# Patient Record
Sex: FEMALE | Race: WHITE | HISPANIC OR LATINO | Employment: UNEMPLOYED | URBAN - METROPOLITAN AREA
[De-identification: names, ages, dates, MRNs, and addresses within clinical notes are randomized per-mention and may not be internally consistent; named-entity substitution may affect disease eponyms.]

---

## 2019-08-12 ENCOUNTER — APPOINTMENT (OUTPATIENT)
Dept: RADIOLOGY | Facility: MEDICAL CENTER | Age: 24
End: 2019-08-12
Attending: EMERGENCY MEDICINE

## 2019-08-12 ENCOUNTER — HOSPITAL ENCOUNTER (EMERGENCY)
Facility: MEDICAL CENTER | Age: 24
End: 2019-08-12
Attending: EMERGENCY MEDICINE

## 2019-08-12 VITALS
WEIGHT: 155 LBS | HEIGHT: 67 IN | DIASTOLIC BLOOD PRESSURE: 83 MMHG | OXYGEN SATURATION: 96 % | BODY MASS INDEX: 24.33 KG/M2 | TEMPERATURE: 98.9 F | HEART RATE: 104 BPM | SYSTOLIC BLOOD PRESSURE: 106 MMHG | RESPIRATION RATE: 16 BRPM

## 2019-08-12 DIAGNOSIS — J45.32 MILD PERSISTENT ASTHMA WITH STATUS ASTHMATICUS: ICD-10-CM

## 2019-08-12 LAB
ANION GAP SERPL CALC-SCNC: 14 MMOL/L (ref 0–11.9)
BASOPHILS # BLD AUTO: 0.5 % (ref 0–1.8)
BASOPHILS # BLD: 0.06 K/UL (ref 0–0.12)
BUN SERPL-MCNC: 7 MG/DL (ref 8–22)
CALCIUM SERPL-MCNC: 9.6 MG/DL (ref 8.5–10.5)
CHLORIDE SERPL-SCNC: 107 MMOL/L (ref 96–112)
CO2 SERPL-SCNC: 18 MMOL/L (ref 20–33)
CREAT SERPL-MCNC: 0.7 MG/DL (ref 0.5–1.4)
EKG IMPRESSION: NORMAL
EOSINOPHIL # BLD AUTO: 1.31 K/UL (ref 0–0.51)
EOSINOPHIL NFR BLD: 10.1 % (ref 0–6.9)
ERYTHROCYTE [DISTWIDTH] IN BLOOD BY AUTOMATED COUNT: 47.4 FL (ref 35.9–50)
GLUCOSE SERPL-MCNC: 98 MG/DL (ref 65–99)
HCT VFR BLD AUTO: 45 % (ref 37–47)
HGB BLD-MCNC: 14.2 G/DL (ref 12–16)
IMM GRANULOCYTES # BLD AUTO: 0.03 K/UL (ref 0–0.11)
IMM GRANULOCYTES NFR BLD AUTO: 0.2 % (ref 0–0.9)
LYMPHOCYTES # BLD AUTO: 3.41 K/UL (ref 1–4.8)
LYMPHOCYTES NFR BLD: 26.2 % (ref 22–41)
MCH RBC QN AUTO: 28.3 PG (ref 27–33)
MCHC RBC AUTO-ENTMCNC: 31.6 G/DL (ref 33.6–35)
MCV RBC AUTO: 89.6 FL (ref 81.4–97.8)
MONOCYTES # BLD AUTO: 0.68 K/UL (ref 0–0.85)
MONOCYTES NFR BLD AUTO: 5.2 % (ref 0–13.4)
NEUTROPHILS # BLD AUTO: 7.51 K/UL (ref 2–7.15)
NEUTROPHILS NFR BLD: 57.8 % (ref 44–72)
NRBC # BLD AUTO: 0 K/UL
NRBC BLD-RTO: 0 /100 WBC
PLATELET # BLD AUTO: 426 K/UL (ref 164–446)
PMV BLD AUTO: 8.2 FL (ref 9–12.9)
POTASSIUM SERPL-SCNC: 3.4 MMOL/L (ref 3.6–5.5)
RBC # BLD AUTO: 5.02 M/UL (ref 4.2–5.4)
SODIUM SERPL-SCNC: 139 MMOL/L (ref 135–145)
WBC # BLD AUTO: 13 K/UL (ref 4.8–10.8)

## 2019-08-12 PROCEDURE — 96365 THER/PROPH/DIAG IV INF INIT: CPT

## 2019-08-12 PROCEDURE — 93005 ELECTROCARDIOGRAM TRACING: CPT | Performed by: EMERGENCY MEDICINE

## 2019-08-12 PROCEDURE — 700101 HCHG RX REV CODE 250: Performed by: EMERGENCY MEDICINE

## 2019-08-12 PROCEDURE — 85025 COMPLETE CBC W/AUTO DIFF WBC: CPT

## 2019-08-12 PROCEDURE — 80048 BASIC METABOLIC PNL TOTAL CA: CPT

## 2019-08-12 PROCEDURE — 94640 AIRWAY INHALATION TREATMENT: CPT

## 2019-08-12 PROCEDURE — 700111 HCHG RX REV CODE 636 W/ 250 OVERRIDE (IP): Performed by: EMERGENCY MEDICINE

## 2019-08-12 PROCEDURE — 99285 EMERGENCY DEPT VISIT HI MDM: CPT

## 2019-08-12 PROCEDURE — 96366 THER/PROPH/DIAG IV INF ADDON: CPT

## 2019-08-12 PROCEDURE — 71045 X-RAY EXAM CHEST 1 VIEW: CPT

## 2019-08-12 PROCEDURE — 96375 TX/PRO/DX INJ NEW DRUG ADDON: CPT

## 2019-08-12 PROCEDURE — 93005 ELECTROCARDIOGRAM TRACING: CPT

## 2019-08-12 RX ORDER — METHYLPREDNISOLONE SODIUM SUCCINATE 125 MG/2ML
125 INJECTION, POWDER, LYOPHILIZED, FOR SOLUTION INTRAMUSCULAR; INTRAVENOUS ONCE
Status: COMPLETED | OUTPATIENT
Start: 2019-08-12 | End: 2019-08-12

## 2019-08-12 RX ORDER — MAGNESIUM SULFATE HEPTAHYDRATE 40 MG/ML
2 INJECTION, SOLUTION INTRAVENOUS ONCE
Status: COMPLETED | OUTPATIENT
Start: 2019-08-12 | End: 2019-08-12

## 2019-08-12 RX ORDER — PREDNISONE 20 MG/1
40 TABLET ORAL DAILY
Qty: 12 TAB | Refills: 0 | Status: SHIPPED | OUTPATIENT
Start: 2019-08-12 | End: 2019-08-18

## 2019-08-12 RX ORDER — ONDANSETRON 2 MG/ML
4 INJECTION INTRAMUSCULAR; INTRAVENOUS ONCE
Status: COMPLETED | OUTPATIENT
Start: 2019-08-12 | End: 2019-08-12

## 2019-08-12 RX ORDER — ALBUTEROL SULFATE 90 UG/1
2 AEROSOL, METERED RESPIRATORY (INHALATION) EVERY 6 HOURS PRN
COMMUNITY

## 2019-08-12 RX ADMIN — ALBUTEROL SULFATE 10 MG/HR: 5 SOLUTION RESPIRATORY (INHALATION) at 11:59

## 2019-08-12 RX ADMIN — ONDANSETRON 4 MG: 2 INJECTION INTRAMUSCULAR; INTRAVENOUS at 12:32

## 2019-08-12 RX ADMIN — METHYLPREDNISOLONE SODIUM SUCCINATE 125 MG: 125 INJECTION, POWDER, FOR SOLUTION INTRAMUSCULAR; INTRAVENOUS at 11:40

## 2019-08-12 RX ADMIN — MAGNESIUM SULFATE IN WATER 2 G: 40 INJECTION, SOLUTION INTRAVENOUS at 11:43

## 2019-08-12 NOTE — ED NOTES
Pt up ambulated around ER .  States she feels better at this time with no SOB.  States she is ready to go home.

## 2019-08-12 NOTE — ED PROVIDER NOTES
"ED Provider Note    Scribed for Jesse Lora M.D. by Milind Victor. 8/12/2019  11:20 AM    Primary care provider: No primary care provider on file.  Means of arrival: Ambulance.  History obtained from: Patient's cousin.  History limited by: None    CHIEF COMPLAINT  Chief Complaint   Patient presents with   • Shortness of Breath     times 3 days.  using albuterol inhaler but not helping.  visiting from [place]       HPI  Diamond Bustamante is a 24 y.o. Female with a history of asthma, who presents to the Emergency Department for shortness of breath onset 3 days ago. She is accompanied by her cousin who reports the patient has associated coughing, sore throat, nausea, and midline chest pain. The cousin also notes the patient has been sounding \"wheezy\". Pain is exacerbated when she coughs. Patient has needed to use her inhaler 2x daily for the past 3 days, however, she normally only needs to use it 2-3 times a week. She denies fever, emesis, or diarrhea. The patient additional reports previous admission to the hospital for asthma, which was not in the ICU.    REVIEW OF SYSTEMS  Pertinent positives include shortness of breath, coughing, sore throat, nausea, chest pain, \"weezy\". Pertinent negatives include no fever, emesis, or diarrhea.  All other systems reviewed and negative.    PAST MEDICAL HISTORY  Asthma.    SURGICAL HISTORY  patient denies any surgical history    SOCIAL HISTORY  Social History     Tobacco Use   • Smoking status: None reported   Substance Use Topics   • Alcohol use: None reported   • Drug use: None reported      Social History     Substance and Sexual Activity   Drug Use None reported       FAMILY HISTORY  None noted when reviewed.    CURRENT MEDICATIONS  Home Medications     Reviewed by Danny Ibarra (Pharmacy Tech) on 08/12/19 at 1228  Med List Status: Complete   Medication Last Dose Status   albuterol 108 (90 Base) MCG/ACT Aero Soln inhalation aerosol 8/12/2019 Active          " "      ALLERGIES  Allergies   Allergen Reactions   • Shellfish Allergy Vomiting     Nauseas         PHYSICAL EXAM  VITAL SIGNS: /90   Pulse (!) 110   Temp 37.2 °C (98.9 °F) (Tympanic)   Resp (!) 28   Ht 1.702 m (5' 7\")   Wt 70.3 kg (155 lb)   BMI 24.28 kg/m²     Vital signs reviewed.  Constitutional:  Appears uncomfortable. No distress.   Head: Normocephalic.   Mouth/Throat: Oropharynx is clear and moist.   Eyes: EOM are normal. Pupils are equal, round, and reactive to light.   Neck: Normal range of motion. Neck supple.   Cardiovascular: Normal rate, regular rhythm and normal heart sounds.  No pedal edema.   Pulmonary/Chest: Lungs have inspiratory and expiratory wheezing with prolonged expiratory phase.  Abdominal: Soft. There is no tenderness. There is no rebound and no guarding. Abnormal bowel sounds.   Musculoskeletal: Exhibits no pitting edema.   Lymphadenopathy: No cervical adenopathy.   Neurological: Patient is alert and oriented to person, place, and time. DTRs intact. Normal sensation and strength.  Skin: Skin is warm and dry. No rash.  Psychiatric: A&O x 3    LABS  Results for orders placed or performed during the hospital encounter of 08/12/19   CBC w/ Differential   Result Value Ref Range    WBC 13.0 (H) 4.8 - 10.8 K/uL    RBC 5.02 4.20 - 5.40 M/uL    Hemoglobin 14.2 12.0 - 16.0 g/dL    Hematocrit 45.0 37.0 - 47.0 %    MCV 89.6 81.4 - 97.8 fL    MCH 28.3 27.0 - 33.0 pg    MCHC 31.6 (L) 33.6 - 35.0 g/dL    RDW 47.4 35.9 - 50.0 fL    Platelet Count 426 164 - 446 K/uL    MPV 8.2 (L) 9.0 - 12.9 fL    Neutrophils-Polys 57.80 44.00 - 72.00 %    Lymphocytes 26.20 22.00 - 41.00 %    Monocytes 5.20 0.00 - 13.40 %    Eosinophils 10.10 (H) 0.00 - 6.90 %    Basophils 0.50 0.00 - 1.80 %    Immature Granulocytes 0.20 0.00 - 0.90 %    Nucleated RBC 0.00 /100 WBC    Neutrophils (Absolute) 7.51 (H) 2.00 - 7.15 K/uL    Lymphs (Absolute) 3.41 1.00 - 4.80 K/uL    Monos (Absolute) 0.68 0.00 - 0.85 K/uL    Eos " (Absolute) 1.31 (H) 0.00 - 0.51 K/uL    Baso (Absolute) 0.06 0.00 - 0.12 K/uL    Immature Granulocytes (abs) 0.03 0.00 - 0.11 K/uL    NRBC (Absolute) 0.00 K/uL   Basic Metabolic Panel (BMP)   Result Value Ref Range    Sodium 139 135 - 145 mmol/L    Potassium 3.4 (L) 3.6 - 5.5 mmol/L    Chloride 107 96 - 112 mmol/L    Co2 18 (L) 20 - 33 mmol/L    Glucose 98 65 - 99 mg/dL    Bun 7 (L) 8 - 22 mg/dL    Creatinine 0.70 0.50 - 1.40 mg/dL    Calcium 9.6 8.5 - 10.5 mg/dL    Anion Gap 14.0 (H) 0.0 - 11.9   ESTIMATED GFR   Result Value Ref Range    GFR If African American >60 >60 mL/min/1.73 m 2    GFR If Non African American >60 >60 mL/min/1.73 m 2   EKG (NOW)   Result Value Ref Range    Report       [facility] Emergency Dept.    Test Date:  2019  Pt Name:    CHANDNI PALMA                 Department: ER  MRN:        [id number]                      Room:       [facility]  Gender:     Female                       Technician: [id number]  :        1995                   Requested By:ER TRIAGE PROTOCOL  Order #:    [id number]                    Reading MD:    Measurements  Intervals                                Axis  Rate:       86                           P:          44  IA:         128                          QRS:        3  QRSD:       92                           T:          54  QT:         380  QTc:        455    Interpretive Statements  SINUS RHYTHM  RSR' IN V1 OR V2, PROBABLY NORMAL VARIANT  No previous ECG available for comparison       All labs reviewed by me.    EKG  12 Lead EKG interpreted by me to show sinus rhythm at 85. Normal P waves. R prime in V 1 with early polarization. Normal ST segments. Normal T waves. Normal EKG with right bundle branch block.    RADIOLOGY  DX-CHEST-PORTABLE (1 VIEW)   Final Result      No acute cardiopulmonary findings.        The radiologist's interpretation of all radiological studies have been reviewed by me.    COURSE & MEDICAL DECISION MAKING  Pertinent Labs &  Imaging studies reviewed. (See chart for details) The patient's [facility] and past medical  records were reviewed    11:20 AM - Patient seen and examined at bedside. Patient will be treated with Zofran 4mg, Solumedrol 125mg, Proventil 2.5 mg/0.5 mL, and magnesium sulfate IVPB premix 2 g. Ordered DX-Chest Portable, CBC w/diff, BMP, Estimated GFR, and EKG to evaluate her symptoms. The differential diagnoses include but are not limited to: Pneumothorax, pneumonia, status asthmaticus    12:56 PM - Patient is feeling much better.   Her work appears unremarkable.  Her EKG shows no signs of ischemia.  She was given magnesium and steroids.  Initially she was quite tachycardic but observed here for some time.  She states she feels much better she was ambulating without difficulty actually ready to go home.    1:40PM- The patient is feeling better. Her heart rate is still in the 140s, which is likely from her albuterol treatment. I will keep her in the ED to monitor her.     2:30PM Patient was reevaluated at bedside. Discussed lab and radiology results with the patient and informed them that there are no acute findings seen. Patient was given return precautions and welcomed to return to the ED. Patient understood and verbalized agreement.       CRITICAL CARE  The very real possibilty of a deterioration of this patient's condition required the highest level of my preparedness for sudden, emergent intervention.  I provided critical care services, which included medication orders, frequent reevaluations of the patient's condition and response to treatment, ordering and reviewing test results, and discussing the case with various consultants.  The critical care time associated with the care of the patient was 30 minutes for status asthmaticus. Review chart for interventions. This time is exclusive of any other billable procedures.      The patient will return for new or worsening symptoms and is stable at the time of  discharge.    The patient is referred to a primary physician for blood pressure management, diabetic screening, and for all other preventative health concerns.      DISPOSITION:  Patient will be discharged home in stable condition.    FOLLOW UP:  [facility], Emergency Dept  [address] [address] [phone]  In 1 day  As needed      OUTPATIENT MEDICATIONS:  Discharge Medication List as of 8/12/2019  2:46 PM      START taking these medications    Details   predniSONE (DELTASONE) 20 MG Tab Take 2 Tabs by mouth every day for 6 days., Disp-12 Tab, R-0, Normal               FINAL IMPRESSION  1. Mild persistent asthma with status asthmaticus          Milind HODGES (Scribe), am scribing for, and in the presence of, Jesse Lora M.D..    Electronically signed by: Milind Victor (Scribe), 8/12/2019    Jesse HODGES M.D. personally performed the services described in this documentation, as scribed by Milind Victor in my presence, and it is both accurate and complete. C.    The note accurately reflects work and decisions made by me.  Jesse Lora  8/12/2019  4:50 PM

## 2019-08-12 NOTE — ED TRIAGE NOTES
Pt BIB EMS secondary to increased SOB times 3 days.  Pt visiting from [place]. Hx of asthma and utilizing her Albuterol with no help.  Pt had duoneb in route by EMS.  Pt is Portuguese speaker only.  Cousin at BS.

## 2019-08-12 NOTE — FLOWSHEET NOTE
08/12/19 1201   Interdisciplinary Plan of Care-Goals (Indications)   Bronchodilator Indications Physical Exam / Hyperinflation / Wheezing (bronchospasm)   Interdisciplinary Plan of Care-Outcomes    Bronchodilator Outcome Diminished Wheezing and Volume of Air Movement Increased   Education   Education Yes - Pt. / Family has been Instructed in use of Respiratory Medications and Adverse Reactions   RT Assessment of Delivered Medications   Evaluation of Medication Delivery Daily Yes-- Pt /Family has been Instructed in use of Respiratory Medications and Adverse Reactions   SVN Group   #SVN Performed Yes   Given By: Mouthpiece   Date SVN Last Changed 08/12/19   Date SVN Next Change Due (Q 7 Days) 08/19/19   Continuous Nebulizer Group   Continuous Nebulizer Q 1 Hour Yes   #Bronchodilator Yes   Respiratory WDL   Respiratory (WDL) X   Chest Exam   Respiration 14   Pulse 90   Breath Sounds   Pre/Post Intervention Pre Intervention Assessment   RUL Breath Sounds Expiratory Wheezes;Diminished   RML Breath Sounds Expiratory Wheezes;Diminished   RLL Breath Sounds Diminished   MCKAYLA Breath Sounds Expiratory Wheezes;Diminished   LLL Breath Sounds Diminished   Oximetry   Continuous Oximetry Yes   O2 Alarms Set & Reviewed Yes   Oxygen   Pulse Oximetry 95 %   O2 (LPM) 0   FiO2% 21 %   O2 Daily Delivery Respiratory  Room Air with O2 Available

## 2022-11-08 NOTE — ED NOTES
Pt verbalized understanding to return to er with worsening symptoms and to follow with PCP in [place] when she gets home.  Pt ambulated out of er in [facility].     RTC in 6 weeks with labs